# Patient Record
Sex: MALE
[De-identification: names, ages, dates, MRNs, and addresses within clinical notes are randomized per-mention and may not be internally consistent; named-entity substitution may affect disease eponyms.]

---

## 2018-03-06 ENCOUNTER — HOSPITAL ENCOUNTER (OUTPATIENT)
Dept: HOSPITAL 92 - ULT | Age: 63
Discharge: HOME | End: 2018-03-06
Attending: NURSE PRACTITIONER
Payer: COMMERCIAL

## 2018-03-06 DIAGNOSIS — R10.32: Primary | ICD-10-CM

## 2018-03-06 PROCEDURE — 76705 ECHO EXAM OF ABDOMEN: CPT

## 2018-03-06 NOTE — ULT
SOFT TISSUE ULTRASOUND:

 

Date:  03/06/18 

 

Directed ultrasound of left inguinal region requested. 

 

HISTORY:  

Inguinal pain. 

 

FINDINGS:

An inguinal hernia cannot be confirmed by ultrasound, although it is not excluded. Patient complains 
of pain just to the left of the base of the penis. This area is evaluated with ultrasound and there i
s a soft tissue fullness in this region of uncertain significance. No definite mass or adenopathy see
n. 

 

IMPRESSION: 

Inguinal hernia cannot be confirmed or ruled out. A questionable soft tissue fullness at the base of 
the penis to the left where patient complains of pain. No circumscribed mass, fluid collection, absce
ss, or adenopathy identified. 

 

 

 

POS: [facility]

## 2018-08-20 ENCOUNTER — HOSPITAL ENCOUNTER (EMERGENCY)
Dept: HOSPITAL 57 - BURERS | Age: 63
Discharge: ANOTHER HOSPITAL | End: 2018-08-20
Payer: SELF-PAY

## 2018-08-20 DIAGNOSIS — K85.90: Primary | ICD-10-CM

## 2018-08-20 DIAGNOSIS — F17.210: ICD-10-CM

## 2018-08-20 LAB
ALBUMIN SERPL BCG-MCNC: 4.6 G/DL (ref 3.4–4.8)
ALP SERPL-CCNC: 92 U/L (ref 40–150)
ALT SERPL W P-5'-P-CCNC: 32 U/L (ref 8–55)
ANION GAP SERPL CALC-SCNC: 15 MMOL/L (ref 10–20)
AST SERPL-CCNC: 28 U/L (ref 5–34)
BASOPHILS # BLD AUTO: 0.1 THOU/UL (ref 0–0.2)
BASOPHILS NFR BLD AUTO: 0.7 % (ref 0–1)
BILIRUB SERPL-MCNC: 1.4 MG/DL (ref 0.2–1.2)
BUN SERPL-MCNC: 10 MG/DL (ref 8.4–25.7)
CALCIUM SERPL-MCNC: 9.7 MG/DL (ref 7.8–10.44)
CHLORIDE SERPL-SCNC: 106 MMOL/L (ref 98–107)
CK MB SERPL-MCNC: 0.6 NG/ML (ref 0–6.6)
CO2 SERPL-SCNC: 22 MMOL/L (ref 23–31)
CREAT CL PREDICTED SERPL C-G-VRATE: 0 ML/MIN (ref 70–130)
EOSINOPHIL # BLD AUTO: 0.3 THOU/UL (ref 0–0.7)
EOSINOPHIL NFR BLD AUTO: 2.2 % (ref 0–10)
GLOBULIN SER CALC-MCNC: 3 G/DL (ref 2.4–3.5)
GLUCOSE SERPL-MCNC: 147 MG/DL (ref 80–115)
HGB BLD-MCNC: 16.8 G/DL (ref 14–18)
LIPASE SERPL-CCNC: 7910 U/L (ref 8–78)
LYMPHOCYTES # BLD AUTO: 1.7 THOU/UL (ref 1.2–3.4)
LYMPHOCYTES NFR BLD AUTO: 11.2 % (ref 21–51)
MCH RBC QN AUTO: 30.8 PG (ref 27–31)
MCV RBC AUTO: 80.4 FL (ref 78–98)
MONOCYTES # BLD AUTO: 1 THOU/UL (ref 0.11–0.59)
MONOCYTES NFR BLD AUTO: 6.6 % (ref 0–10)
NEUTROPHILS # BLD AUTO: 11.9 THOU/UL (ref 1.4–6.5)
NEUTROPHILS NFR BLD AUTO: 79.4 % (ref 42–75)
PLATELET # BLD AUTO: 122 THOU/UL (ref 130–400)
POTASSIUM SERPL-SCNC: 4 MMOL/L (ref 3.5–5.1)
RBC # BLD AUTO: 5.46 MILL/UL (ref 4.7–6.1)
SODIUM SERPL-SCNC: 139 MMOL/L (ref 136–145)
SP GR UR STRIP: 1.01 (ref 1–1.03)
TROPONIN I SERPL DL<=0.01 NG/ML-MCNC: (no result) NG/ML (ref ?–0.03)
WBC # BLD AUTO: 15 THOU/UL (ref 4.8–10.8)

## 2018-08-20 PROCEDURE — 81003 URINALYSIS AUTO W/O SCOPE: CPT

## 2018-08-20 PROCEDURE — A4216 STERILE WATER/SALINE, 10 ML: HCPCS

## 2018-08-20 PROCEDURE — 83605 ASSAY OF LACTIC ACID: CPT

## 2018-08-20 PROCEDURE — C9113 INJ PANTOPRAZOLE SODIUM, VIA: HCPCS

## 2018-08-20 PROCEDURE — 82553 CREATINE MB FRACTION: CPT

## 2018-08-20 PROCEDURE — 93005 ELECTROCARDIOGRAM TRACING: CPT

## 2018-08-20 PROCEDURE — 80053 COMPREHEN METABOLIC PANEL: CPT

## 2018-08-20 PROCEDURE — 83690 ASSAY OF LIPASE: CPT

## 2018-08-20 PROCEDURE — 96374 THER/PROPH/DIAG INJ IV PUSH: CPT

## 2018-08-20 PROCEDURE — 96361 HYDRATE IV INFUSION ADD-ON: CPT

## 2018-08-20 PROCEDURE — 96375 TX/PRO/DX INJ NEW DRUG ADDON: CPT

## 2018-08-20 PROCEDURE — 84484 ASSAY OF TROPONIN QUANT: CPT

## 2018-08-20 PROCEDURE — 74177 CT ABD & PELVIS W/CONTRAST: CPT

## 2018-08-20 PROCEDURE — 85025 COMPLETE CBC W/AUTO DIFF WBC: CPT

## 2018-08-20 NOTE — CT
CT ABDOMEN AND PELVIS WITH CONTRAST:

 

08/20/2018

 

TECHNIQUE:

A spiral CT of the abdomen and pelvis was performed for evaluation of severe left upper quadrant pain
.  Axial slices were acquired and then coronal and sagittal reconstructions were done.

 

FINDINGS:

The lung bases are clear, except for some minor dependent atelectasis.  There may be a trace of pleur
al fluid or thickening in the right posterior hemithorax.

 

A major finding on the study is fluid surrounding the pancreas, suggesting a diagnosis of acute pancr
eatitis.  There is no sign of pseudo cyst or mass.  I see no stones in the gallbladder or in the comm
on bile duct, as it enters the pancreas.

 

The liver, spleen, adrenal glands, kidneys, and abdominal aorta appear normal.  No pathology is seen 
in an of these organs.

 

There is no evidence of bowel obstruction.  No inflammatory changes are seen around bowel, except for
 some mild thickening of the duodenal C-loop, to be expected due to the adjacent pancreatic inflammat
ion.  No free air is seen.

 

CT pelvis showed no pelvic masses, fluid collections, or inflammatory changes here.

 

IMPRESSION:

Findings consistent with acute pancreatitis.

 

POS: HOME